# Patient Record
Sex: FEMALE | HISPANIC OR LATINO | Employment: UNEMPLOYED | URBAN - METROPOLITAN AREA
[De-identification: names, ages, dates, MRNs, and addresses within clinical notes are randomized per-mention and may not be internally consistent; named-entity substitution may affect disease eponyms.]

---

## 2023-12-19 ENCOUNTER — OFFICE VISIT (OUTPATIENT)
Dept: OBSTETRICS AND GYNECOLOGY | Facility: CLINIC | Age: 23
End: 2023-12-19
Payer: MEDICAID

## 2023-12-19 VITALS
WEIGHT: 133 LBS | HEART RATE: 80 BPM | SYSTOLIC BLOOD PRESSURE: 109 MMHG | BODY MASS INDEX: 22.71 KG/M2 | HEIGHT: 64 IN | DIASTOLIC BLOOD PRESSURE: 73 MMHG

## 2023-12-19 DIAGNOSIS — Z13.1 SCREENING FOR DIABETES MELLITUS: ICD-10-CM

## 2023-12-19 DIAGNOSIS — N91.2 ABSENT MENSES: Primary | ICD-10-CM

## 2023-12-19 DIAGNOSIS — Z12.4 SCREENING FOR MALIGNANT NEOPLASM OF CERVIX: ICD-10-CM

## 2023-12-19 DIAGNOSIS — Z13.29 SCREENING FOR THYROID DISORDER: ICD-10-CM

## 2023-12-19 DIAGNOSIS — Z11.3 SCREEN FOR SEXUALLY TRANSMITTED DISEASES: ICD-10-CM

## 2023-12-19 LAB
B-HCG UR QL: NEGATIVE
EXPIRATION DATE: NORMAL
INTERNAL NEGATIVE CONTROL: NORMAL
INTERNAL POSITIVE CONTROL: NORMAL
Lab: NORMAL

## 2023-12-19 NOTE — PROGRESS NOTES
Chief Complaint  New Gyn (Patient is here today to discuss absent menses- has not had a period since July. All at home pregnancy tests have been negative. )    Entirety of today's encounter including patient interview, exam, and counseling performed with the aid of a medical  via the telephone.      Subjective        Karen Vizcarra presents to [facility]  History of Present Illness  Patient is here to discuss problems with irregular menses.  She has she has not had a period since July.  Prior to that she got about 3 months without having a period as well.  Before that point, she had no issues with menstrual irregularities.  She says before roughly 2023 her periods occurred regularly every month.  She does report some recent weight loss.  She also reports positive hot intolerance.  She does complain of hair loss and acne.  She denies headache or visual disturbances.  She denies galactorrhea.  She has had a history of 2 previous pregnancies which resulted in termination.  She is currently sexually active.  She reports she has not had a Pap smear in many years.    Menstrual History:  OB History          2    Para   0    Term   0       0    AB   2    Living   0         SAB   0    IAB   2    Ectopic   0    Molar   0    Multiple   0    Live Births   0               Menarche age: 8  No LMP recorded.     History reviewed. No pertinent past medical history.    History reviewed. No pertinent surgical history.    Social History     Tobacco Use    Smoking status: Never    Smokeless tobacco: Never   Vaping Use    Vaping Use: Never used   Substance Use Topics    Alcohol use: Never    Drug use: Never     Family History   Problem Relation Age of Onset    Breast cancer Neg Hx     Ovarian cancer Neg Hx     Uterine cancer Neg Hx     Colon cancer Neg Hx      No current outpatient medications on file prior to visit.     No current facility-administered medications  "on file prior to visit.     No Known Allergies    Review of systems:  Constitutional: No fevers, chills, sweats   Eye: No recent visual problems, denies blurry vision   HEENT: No ear pain, nasal congestion, sore throat, voice changes   Respiratory: No shortness of breath, cough, pain on breathing   Cardiovascular: No Chest pain, palpitations   Gastrointestinal: No nausea, vomiting, diarrhea, constipation   Genitourinary: No hematuria, dysuria, lesions on genitalia   Hema/Lymph: Negative for bruising, no edema   Endocrine: Negative for excessive thirst, excessive hunger, heat or cold intolerance   Musculoskeletal: No joint pain, muscle pain, decreased range of motion   Integumentary: No rash, pruritus, abrasions, lesions   Neurologic: No weakness, numbness, headaches   Psychiatric: No anxiety, depression, mood changes         Objective   Vital Signs:  /73   Pulse 80   Ht 162.6 cm (64\")   Wt 60.3 kg (133 lb)   BMI 22.83 kg/m²   Estimated body mass index is 22.83 kg/m² as calculated from the following:    Height as of this encounter: 162.6 cm (64\").    Weight as of this encounter: 60.3 kg (133 lb).       BMI is within normal parameters. No other follow-up for BMI required.      Physical Exam   Exam performed in the presence of a female chaperone  Patient has provided verbal consent to proceed with exam.    Gen: No acute distress, awake and oriented times three  HENT: Normocephalic, atraumatic, Moist mucous membranes  Eyes: PERRLA, EOMI  Lungs: Normal work of breathing, lungs clear bilaterally  Abdomen: soft, nontender, non distended, normoactive bowel sounds  Normal external female genitalia, no lesions  Urethra: Normal meatus, no caruncle  Bladder: nontender  Vagina: No blood or discharge  Cervix: No cervical motion tenderness, no lesions, no active bleeding, nonfriable  Uterus: Anteverted, normal size and shape, nontender  Adnexa: No masses or tenderness  External anal exam: Normal appearance, no lesions or " hemorrhoids  Rectal: Deferred  Skin: Warm and dry, no rashes  Psych: Good judgement and insight, normal affect and mood  Neuro: CN 2-12 intact, no gross deficits    Result Review :  The following data was reviewed by: Richard Yi MD on 12/19/2023:        Office Visit on 12/19/2023   Component Date Value Ref Range Status    HCG, Urine, QL 12/19/2023 Negative  Negative Final    Lot Number 12/19/2023 [id number]   Final    Internal Positive Control 12/19/2023 Passed  Positive, Passed Final    Internal Negative Control 12/19/2023 Passed  Negative, Passed Final    Expiration Date 12/19/2023 02/28/2025   Final                Assessment and Plan   Diagnoses and all orders for this visit:    1. Absent menses (Primary)  -     POC Pregnancy, Urine  -     Testosterone  -     Testosterone Free Direct  -     Prolactin  -     Follicle Stimulating Hormone  -     Luteinizing Hormone  -     Estradiol  -     Antimullerian Hormone (AMH)  -     Hemoglobin A1c  -     TSH Rfx On Abnormal To Free T4  -     HCG, B-subunit, Quantitative  -     US Non-ob Transvaginal; Future    We discussed the many potential etiologies of the menstrual changes that she is currently experiencing.  Will check labs as above.  Obtain GYN ultrasound.  Follow-up in the office in about 2-3 weeks to review the results and discuss therapeutic options going forward.    2. Screening for malignant neoplasm of cervix  -     IGP,CtNgTv,rfx Apt HPV All    Patient has not had a Pap smear in some time.  Pap smear performed today.    3. Screen for sexually transmitted diseases  -     IGP,CtNgTv,rfx Apt HPV All    4. Screening for thyroid disorder  -     TSH Rfx On Abnormal To Free T4    5. Screening for diabetes mellitus  -     Hemoglobin A1c             Follow Up   Return GYN US and GYN FU 2-3 weeks at any office including [facility].  Patient was given instructions and counseling regarding her condition or for health maintenance advice. Please see specific information  pulled into the AVS if appropriate.

## 2023-12-20 LAB
ESTRADIOL SERPL-MCNC: 38.8 PG/ML
FSH SERPL-ACNC: 6.1 MIU/ML
HBA1C MFR BLD: 5.8 % (ref 4.8–5.6)
HCG INTACT+B SERPL-ACNC: <1 MIU/ML
LH SERPL-ACNC: 27.5 MIU/ML
PROLACTIN SERPL-MCNC: 14.5 NG/ML (ref 4.8–33.4)
TESTOST SERPL-MCNC: 39 NG/DL (ref 13–71)
TSH SERPL DL<=0.005 MIU/L-ACNC: 4.17 UIU/ML (ref 0.45–4.5)

## 2023-12-22 LAB — MIS SERPL-MCNC: 12.9 NG/ML

## 2023-12-26 LAB
C TRACH RRNA CVX QL NAA+PROBE: NEGATIVE
CONV .: NORMAL
CYTOLOGIST CVX/VAG CYTO: NORMAL
CYTOLOGY CVX/VAG DOC CYTO: NORMAL
CYTOLOGY CVX/VAG DOC THIN PREP: NORMAL
DX ICD CODE: NORMAL
HIV 1 & 2 AB SER-IMP: NORMAL
Lab: NORMAL
N GONORRHOEA RRNA CVX QL NAA+PROBE: NEGATIVE
OTHER STN SPEC: NORMAL
SPECIMEN STATUS: NORMAL
STAT OF ADQ CVX/VAG CYTO-IMP: NORMAL
T VAGINALIS RRNA SPEC QL NAA+PROBE: NEGATIVE
TESTOST FREE SERPL-MCNC: 0.5 PG/ML (ref 0–4.2)

## 2024-01-09 ENCOUNTER — OFFICE VISIT (OUTPATIENT)
Dept: OBSTETRICS AND GYNECOLOGY | Facility: CLINIC | Age: 24
End: 2024-01-09
Payer: MEDICAID

## 2024-01-09 VITALS
BODY MASS INDEX: 22.71 KG/M2 | WEIGHT: 133 LBS | HEIGHT: 64 IN | SYSTOLIC BLOOD PRESSURE: 115 MMHG | DIASTOLIC BLOOD PRESSURE: 79 MMHG

## 2024-01-09 DIAGNOSIS — E28.2 PCOS (POLYCYSTIC OVARIAN SYNDROME): ICD-10-CM

## 2024-01-09 DIAGNOSIS — N91.2 ABSENT MENSES: Primary | ICD-10-CM

## 2024-01-09 RX ORDER — MEDROXYPROGESTERONE ACETATE 5 MG/1
5 TABLET ORAL DAILY
Qty: 7 TABLET | Refills: 2 | Status: SHIPPED | OUTPATIENT
Start: 2024-01-09 | End: 2024-01-16

## 2024-01-09 RX ORDER — PNV NO.95/FERROUS FUM/FOLIC AC 28MG-0.8MG
1 TABLET ORAL DAILY
Qty: 30 TABLET | Refills: 11 | Status: SHIPPED | OUTPATIENT
Start: 2024-01-09

## 2024-01-09 RX ORDER — LETROZOLE 2.5 MG/1
2.5 TABLET, FILM COATED ORAL DAILY
Qty: 5 TABLET | Refills: 5 | Status: SHIPPED | OUTPATIENT
Start: 2024-01-09

## 2024-01-09 NOTE — PROGRESS NOTES
"Chief Complaint  Gynecologic Exam (Patient is here for 3 week f/u with US today)    Entirety of today's encounter including patient interview, exam, and counseling performed with the aid of a medical  via the telephone.     Subjective        Karen Vizcarra presents to [facility]  History of Present Illness  NO menses since the time of her last visit. She had labs last visit and US today.    Patient states that the feels that she would like to try to conceive in the near future.    Patient's last menstrual period was 07/26/2023 (exact date).    Objective   Vital Signs:  /79   Ht 162.6 cm (64.02\")   Wt 60.3 kg (133 lb)   BMI 22.82 kg/m²   Estimated body mass index is 22.82 kg/m² as calculated from the following:    Height as of this encounter: 162.6 cm (64.02\").    Weight as of this encounter: 60.3 kg (133 lb).       BMI is within normal parameters. No other follow-up for BMI required.      Physical Exam   General: No acute distress, awake and oriented x3  Psychiatric: good judgment and insight, normal mood  Neurological: cranial nerves II through XII intact, no deficits    Result Review :          Office Visit on 12/19/2023   Component Date Value Ref Range Status    HCG, Urine, QL 12/19/2023 Negative  Negative Final    Lot Number 12/19/2023 [id number]   Final    Internal Positive Control 12/19/2023 Passed  Positive, Passed Final    Internal Negative Control 12/19/2023 Passed  Negative, Passed Final    Expiration Date 12/19/2023 02/28/2025   Final    Testosterone, Total 12/19/2023 39  13 - 71 ng/dL Final    Testosterone, Free 12/19/2023 0.5  0.0 - 4.2 pg/mL Final    Prolactin 12/19/2023 14.5  4.8 - 33.4 ng/mL Final                  **Please note reference interval change**    FSH 12/19/2023 6.1  mIU/mL Final    Comment:                      Adult Female             Range                        Follicular phase      3.5 -  12.5                        Ovulation " phase       4.7 -  21.5                        Luteal phase          1.7 -   7.7                        Postmenopausal       25.8 - 134.8      LH 12/19/2023 27.5  mIU/mL Final    Comment:                      Adult Female              Range                        Follicular phase      2.4 -  12.6                        Ovulation phase      14.0 -  95.6                        Luteal phase          1.0 -  11.4                        Postmenopausal        7.7 -  58.5      Estradiol 12/19/2023 38.8  pg/mL Final    Comment:                      Adult Female             Range                        Follicular phase     12.5 - 166.0                        Ovulation phase      85.8 - 498.0                        Luteal phase         43.8 - 211.0                        Postmenopausal       <6.0 -  54.7                       Pregnancy                        1st trimester     215.0 - >4300.0  Roche ECLIA methodology      Anti-Mullerian Hormone (AMH) 12/19/2023 12.9 (H)  ng/mL Final    Comment: For assays employing antibodies, the possibility exists for  interference by heterophile antibodies in the samples.1  1.Daron RICHARD  Interferences in Immunoassays - still a threat.   Clin. Chem. 2000; 46: 0707-8461.  This test was developed and its performance characteristics  determined by [employer]. It has not been cleared or approved  by the Food and Drug Administration.  Reference Range:  Females 20 - 25y: 1.23 - 11.51  Median  4.70  AMH concentrations of >= 1.06 ng/mL is correlated with a  better response to ovarian stimulation, produced more  retrievable oocytes and higher odds of live birth according  to Shalondaer et al.  Fertility and Sterility. 2010:  94:2892-2942.  The current AMH test method correlates with  the study method with a slope of 0.94.  Females at risk of ovarian hyperstimulation syndrome or  polycystic ovarian syndrome (PCOS) may exhibit elevated  serum AMH concentrations.   AMH levels from PCOS patients  may be 2 to 5  fold higher than age-appropriate reference  inter                           elías values.  Granulosa cell tumors of the ovary may secrete AMH along  with other tumor markers.  Elevated AMH is not specific for  malignancy, and the assay should not be used exclusively to  diagnose or exclude an AMH-secreting ovarian tumor.      Hemoglobin A1C 12/19/2023 5.8 (H)  4.8 - 5.6 % Final    Comment:          Prediabetes: 5.7 - 6.4           Diabetes: >6.4           Glycemic control for adults with diabetes: <7.0      TSH 12/19/2023 4.170  0.450 - 4.500 uIU/mL Final    HCG Quantitative 12/19/2023 <1  mIU/mL Final    Comment:                      Female (Non-pregnant)    0 -     5                              (Postmenopausal)  0 -     8                       Female (Pregnant)                       Weeks of Gestation                               3                6 -    71                               4               10 -   750                               5              496 -  5771                               6              306 - 84977                               7             [id number] -[id number]                               8            43776 -[id number]                               9            04712 -268891                              10            08476 -036222                              12            25718 -257698                              14            83025 - 06963                              15            32779 - 07597                              16             3076 - 09675                              17             6893 - 39963                              18             4257 - 53618  Roche E                           CLIA methodology      Diagnosis 12/20/2023 Comment   Final    Comment: NEGATIVE FOR INTRAEPITHELIAL LESION OR MALIGNANCY.  THIS SPECIMEN WAS RESCREENED AS PART OF OUR  PROGRAM.      Specimen adequacy: 12/20/2023 Comment   Final    Comment: Satisfactory for evaluation.  Endocervical  and/or squamous metaplastic  cells (endocervical component) are present.      Clinician Provided ICD-10: 12/20/2023 Comment   Final    Comment: Z12.4  Z11.3      Performed by: 12/20/2023 Comment   Final    Leigh Jimenez, Cytotechnologist ([facility])    QC reviewed by: 12/20/2023 Comment   Final    Mónica Garcia, Cytotechnologist ([facility])    . 12/20/2023 .   Final    Note: 12/20/2023 Comment   Final    Comment: The Pap smear is a screening test designed to aid in the detection of  premalignant and malignant conditions of the uterine cervix.  It is not a  diagnostic procedure and should not be used as the sole means of detecting  cervical cancer.  Both false-positive and false-negative reports do occur.      Method: 12/20/2023 Comment   Final    Comment: This liquid based ThinPrep(R) pap test was screened with the  use of an image guided system.      Conv .conv 12/20/2023 Comment   Final    Comment: The HPV DNA reflex criteria were not met with this specimen result  therefore, no HPV testing was performed.      Chlamydia, Nuc. Acid Amp 12/20/2023 Negative  Negative Final    Gonococcus by Nucleic Acid Amp 12/20/2023 Negative  Negative Final    Trichomonas vaginosis 12/20/2023 Negative  Negative Final    Specimen Status 12/20/2023 Comment   Final    Comment: Please note  Please note  The date and/or time of collection was not indicated on the  requisition as required by state and federal law.  The date of  receipt of the specimen was used as the collection date if not  supplied.       Ultrasound today:  Findings:  Uterus measures 7.97 x 4.79 x 3.70 cm, volume 73.960 cm cube  There are no intramural or subserosal fibroids or masses identified.  Endometrial thickness measures 1.36 cm, and is homogenous appearance without evidence of polyps.  Cervix is normal-appearing.    Left ovary: 3.79 x 2.68 x 2.73 cm, volume 14.519 cm cube  The ovary appears polycystic.  There is no pathologic appearing ovarian or adnexal mass  or cyst.    Right ovary: 4.97 x 1.93 x 2.30 cm, volume 11.552 cm cube  The ovary appears polycystic.  There is no pathologic appearing ovarian or adnexal mass or cyst.    There is no free fluid.    Impression:    Polycystic appearance noted to both ovaries.  Otherwise normal ultrasound findings.         Assessment and Plan   Diagnoses and all orders for this visit:    1. Absent menses (Primary)    2. PCOS (polycystic ovarian syndrome)  -     medroxyPROGESTERone (PROVERA) 5 MG tablet; Take 1 tablet by mouth Daily for 7 days.  Dispense: 7 tablet; Refill: 2  -     letrozole (FEMARA) 2.5 MG tablet; Take 1 tablet by mouth Daily. Take for 5 days starting on third day of menstrual cycle  Dispense: 5 tablet; Refill: 5  -     Prenatal Vit-Fe Fumarate-FA (Prenatal Vitamin) 27-0.8 MG tablet; Take 1 tablet by mouth Daily.  Dispense: 30 tablet; Refill: 11    Patient's symptoms most likely secondary to polycystic ovarian syndrome (PCOS). Ultrasound findings today also consistent with PCOS.  Recent lab work also consistent with PCOS given marked elevation of AMH, elevated LH: FSH ratio, and borderline elevated hemoglobin A1c. Discussed syndrome with pt at length. Discussed potential effects on fertility and pregnancy. Discussed effects on menses including irregular menses and heavy bleeding. Discussed potential long-term health risks such as endometrial hyperplasia/malignancy, risks of type 2 diabetes, and heart disease. The effects of obesity on the disease process were explained, and the benefits of diet/exercise/weight loss discussed.   Discussed therapeutic options to help with irregular bleeding. Discussed use of hormonal medication for menstrual regulation as well as prevention of unopposed estrogen effect on endometrium. Discussed hormonal contraception versus cyclic provera.     Patient wishes to try to conceive.  Start prenatal vitamins.  We will attempt to induce withdrawal bleed with Provera and then start letrozole on  day 3.  Instructions given to patient at length.  She would need to follow-up 21 days after the start of her next menstrual cycle for day 21 progesterone level.  Educational handouts in Hebrew on PCOS were provided to the patient.       Follow Up   Return Day 21 of next cycle - pt to call on first day of next menses.  Patient was given instructions and counseling regarding her condition or for health maintenance advice. Please see specific information pulled into the AVS if appropriate.